# Patient Record
Sex: FEMALE | Race: BLACK OR AFRICAN AMERICAN | NOT HISPANIC OR LATINO | Employment: UNEMPLOYED | URBAN - NONMETROPOLITAN AREA
[De-identification: names, ages, dates, MRNs, and addresses within clinical notes are randomized per-mention and may not be internally consistent; named-entity substitution may affect disease eponyms.]

---

## 2022-03-30 ENCOUNTER — OFFICE VISIT (OUTPATIENT)
Dept: PODIATRY | Facility: CLINIC | Age: 27
End: 2022-03-30

## 2022-03-30 VITALS — HEART RATE: 97 BPM | BODY MASS INDEX: 24.1 KG/M2 | HEIGHT: 63 IN | WEIGHT: 136 LBS | OXYGEN SATURATION: 97 %

## 2022-03-30 DIAGNOSIS — M77.42 METATARSALGIA OF BOTH FEET: ICD-10-CM

## 2022-03-30 DIAGNOSIS — M24.573 EQUINUS CONTRACTURE OF ANKLE: Primary | ICD-10-CM

## 2022-03-30 DIAGNOSIS — M77.41 METATARSALGIA OF BOTH FEET: ICD-10-CM

## 2022-03-30 DIAGNOSIS — L84 CORNS AND CALLOSITIES: ICD-10-CM

## 2022-03-30 PROCEDURE — 99203 OFFICE O/P NEW LOW 30 MIN: CPT | Performed by: PODIATRIST

## 2022-03-30 NOTE — PROGRESS NOTES
"Payton Lee  1995  27 y.o. female      03/30/2022    Chief Complaint   Patient presents with   • Left Foot - Callouses   • Right Foot - Callouses       History of Present Illness    Payton Lee is a 27 y.o.female patient came to clinic with mother for concern of bilateral callus.       History reviewed. No pertinent past medical history.      History reviewed. No pertinent surgical history.      Family History   Problem Relation Age of Onset   • Psoriasis Mother    • Cancer Maternal Grandmother        No Known Allergies    Social History     Socioeconomic History   • Marital status: Single   Tobacco Use   • Smoking status: Never Smoker   • Smokeless tobacco: Never Used   Vaping Use   • Vaping Use: Never used   Substance and Sexual Activity   • Alcohol use: Defer   • Drug use: Defer         No current outpatient medications on file.     No current facility-administered medications for this visit.       Review of Systems   Constitutional: Negative.    HENT: Negative.    Eyes: Negative.    Respiratory: Negative.    Cardiovascular: Negative.    Gastrointestinal: Negative.    Endocrine: Negative.    Genitourinary: Negative.    Musculoskeletal:        Foot pain    Skin: Negative.    Allergic/Immunologic: Negative.    Neurological: Negative.    Hematological: Negative.    Psychiatric/Behavioral: Positive for confusion.         OBJECTIVE    Pulse 97   Ht 160 cm (63\")   Wt 61.7 kg (136 lb)   SpO2 97%   BMI 24.09 kg/m²     Physical Exam  Vitals reviewed.   Constitutional:       General: She is not in acute distress.     Appearance: She is well-developed.   HENT:      Head: Normocephalic and atraumatic.      Nose: Nose normal.   Eyes:      Conjunctiva/sclera: Conjunctivae normal.      Pupils: Pupils are equal, round, and reactive to light.   Pulmonary:      Effort: Pulmonary effort is normal. No respiratory distress.      Breath sounds: No wheezing.   Musculoskeletal:         General: No deformity. Normal range of " motion.   Skin:     General: Skin is warm and dry.      Capillary Refill: Capillary refill takes less than 2 seconds.   Neurological:      Mental Status: She is alert and oriented to person, place, and time.   Psychiatric:         Behavior: Behavior normal.         Thought Content: Thought content normal.          Lower Extremity Exam:     Cardiovascular:    Palpable pedal pulses bilateral  Musculoskeletal:  Muscle strength is WNL bilateral  Equinus bilateral  Dermatological:   Hyperkeratotic tissue noted to plantar fifth metatarsal head bilateral        Procedures        ASSESSMENT AND PLAN    Diagnoses and all orders for this visit:    1. Equinus contracture of ankle (Primary)    2. Metatarsalgia of both feet    3. Corns and callosities        - Patient examined and evaluated  -Educated patient on proper callus care.  -Start daily stretching of the Achilles tendon  -Rx for custom orthotics with offloading of the fifth metatarsal heads  - All questions were answered   - RTC as needed            This document has been electronically signed by Isaias Marley DPM on March 31, 2022 11:49 CDT     3/31/2022  11:49 CDT

## 2022-04-05 ENCOUNTER — TRANSCRIBE ORDERS (OUTPATIENT)
Dept: PODIATRY | Facility: CLINIC | Age: 27
End: 2022-04-05

## 2022-04-05 DIAGNOSIS — M24.573 EQUINUS CONTRACTURE OF ANKLE: ICD-10-CM

## 2022-04-05 DIAGNOSIS — M77.40 METATARSALGIA, UNSPECIFIED LATERALITY: Primary | ICD-10-CM

## 2022-04-14 ENCOUNTER — HOSPITAL ENCOUNTER (OUTPATIENT)
Dept: PHYSICAL THERAPY | Facility: HOSPITAL | Age: 27
Setting detail: THERAPIES SERIES
Discharge: HOME | End: 2022-04-14

## 2022-04-14 DIAGNOSIS — M24.573 EQUINUS CONTRACTURE OF ANKLE: ICD-10-CM

## 2022-04-14 DIAGNOSIS — M77.41 METATARSALGIA OF BOTH FEET: Primary | ICD-10-CM

## 2022-04-14 DIAGNOSIS — M77.42 METATARSALGIA OF BOTH FEET: Primary | ICD-10-CM

## 2022-04-14 PROCEDURE — 97162 PT EVAL MOD COMPLEX 30 MIN: CPT | Performed by: PHYSICAL THERAPIST

## 2022-04-14 NOTE — THERAPY EVALUATION
Outpatient Physical Therapy Ortho Initial Evaluation  [facility]     Patient Name: Payton Lee  : 1995  MRN: [id number]  Today's Date: 2022      Visit Date: 2022    Patient seen for 1 PT sessions.  Patient reports N/A% of improvement.  Next MD appt: ARTIE.  Recertification: N/A.    Therapy Diagnosis: B metarsalgia/Orthotics         Past Medical History:   Diagnosis Date   • Asthma         History reviewed. No pertinent surgical history.    Visit Dx:     ICD-10-CM ICD-9-CM   1. Metatarsalgia of both feet  M77.41 726.70    M77.42    2. Equinus contracture of ankle  M24.573 718.47          Patient History     Row Name 22 1600             History    Chief Complaint Pain  -AJ      Type of Pain Foot pain  -AJ      Date Current Problem(s) Began --  Chronic  -AJ      Brief Description of Current Complaint patient rpeorts B foot pain for sometime. Reports no previous orthotics.  -AJ      Patient/Caregiver Goals Relieve pain  -AJ      Current Tobacco Use None  -AJ      Smoking Status Non-smoker  -AJ      Patient's Rating of General Health Very good  -AJ      Occupation/sports/leisure activities Occupation: disabled; Hobbies: go out side and play  -AJ      History of Previous Related Injuries None  -AJ              Pain     Pain Location Foot  -AJ      Pain at Present 0  -AJ      Pain at Best 0  -AJ      Pain Frequency Intermittent  -AJ      What Performance Factors Make the Current Problem(s) WORSE? walking, standing  -AJ      What Performance Factors Make the Current Problem(s) BETTER? rest, get off feet  -AJ      Is your sleep disturbed? No  -AJ      Is medication used to assist with sleep? No  -AJ            User Key  (r) = Recorded By, (t) = Taken By, (c) = Cosigned By    Initials Name Provider Type    Mckenzie Conley, PT DPT Physical Therapist                 PT Ortho     Row Name 22 1600       Subjective Comments    Subjective Comments see history  -AJ       Precautions  and Contraindications    Precautions/Limitations no known precautions/limitations  -AJ       Subjective Pain    Able to rate subjective pain? yes  -AJ    Pre-Treatment Pain Level 0  -AJ    Post-Treatment Pain Level 0  -AJ       Posture/Observations    Alignment Options Foot pronation;Pes Planus  -AJ    Foot pronation Bilateral:;Mild;Increased  -AJ    Pes Planus Bilateral:;Mild;Increased  -    Posture/Observations Comments Wearing B tennis shoes, untied.  -AJ       Special Tests/Palpation    Special Tests/Palpation --  No areas TTP.  -AJ       General ROM    GENERAL ROM COMMENTS AROm B feet and ankles WFL.  -AJ       MMT (Manual Muscle Testing)    General MMT Comments Strengh of B feet and ankles WFL.  -AJ       Sensation    Sensation WNL? WNL  -AJ    Light Touch No apparent deficits  -AJ       Pathomechanics    Lower Extremity Pathomechanics --  B LE ER.  -AJ       Transfers    Comment, (Transfers) I with all transfers.  -AJ       Gait/Stairs (Locomotion)    Comment, (Gait/Stairs) FWB, non-antalgic gait, no distress.  -AJ          User Key  (r) = Recorded By, (t) = Taken By, (c) = Cosigned By    Initials Name Provider Type    AJ Mckenzie Edward, PT DPT Physical Therapist                            Therapy Education  Given: Other (comment) (shoe wear)  Program: New  How Provided: Verbal  Provided to: Patient, Caregiver  Level of Understanding: Verbalized      PT OP Goals     Row Name 04/14/22 1600          PT Short Term Goals    STG 1 Patient to be molded with B orthotics.  -AJ     STG 1 Progress Met  -     STG 2 Patien/Family to show understanding of good shoe wear choices.  -AJ     STG 3 Patien/Family to show understanding of B orthotic wear.  -     STG 4 Patien/Family to show understanding of B orthotic care.  -     STG 5 Patien/Family to show understanding of skin inspections.  -            Long Term Goals    LTG 1 Patien/Family to show understanding of need for follow up with referring provider  for orthotic adjustments.  -            Time Calculation    PT Goal Re-Cert Due Date --  N/A  -           User Key  (r) = Recorded By, (t) = Taken By, (c) = Cosigned By    Initials Name Provider Type    Mckenzie Conley, PT DPT Physical Therapist              Barriers to Rehab: Include significant or possible arthritic/degenerative changes that have occurred within the joints, The chronicity of this issue.    Safety Issues: None noted.        PT Assessment/Plan     Row Name 04/14/22 1600          PT Assessment    Functional Limitations Impaired gait;Limitation in home management;Limitations in community activities;Performance in leisure activities  -     Impairments Pain;Joint integrity;Impaired postural alignment  -     Assessment Comments Patient is a 26yo female who presents t othe clinic today with mother who waits in waiting areas. She has complaints of B foot pain and has B equinus deformities of the ankle with increased inversion. She was molded today with B slipper cast for molds to be sent off for custom orthotics made by lab. Good molds were obtained today.  -     Rehab Potential Good  For B orthotic wear  -     Patient/caregiver participated in establishment of treatment plan and goals Yes  -            PT Plan    PT Frequency --  1+1 = 2 visits  -     Planned CPT's? PT EVAL MOD COMPLELITY: 66366;PT THER SUPP EA 15 MIN  level 2 orthotics  -     PT Plan Comments Fit and train with B orthotics.  -           User Key  (r) = Recorded By, (t) = Taken By, (c) = Cosigned By    Initials Name Provider Type    Mckenzie Conley, PT DPT Physical Therapist            Other therapeutic activities and/or exercises will be prescribed depending on the patient's progress or lack thereof.                  Time Calculation:     Start Time: 1557  Stop Time: 1633  Time Calculation (min): 36 min     Therapy Charges for Today     Code Description Service Date Service Provider Modifiers Qty     55381658224 HC PT EVAL MOD COMPLEXITY 2 4/14/2022 Mckenzie Edward, PT DPT GP 1    19442133904 HC PT THER SUPP EA 15 MIN 4/14/2022 Mckenzie Edward, PT DPT GP 1    73535670519 HC PT-CUSTOM ORTHOTICS-LEVEL 2 4/14/2022 Mckenzie Edward, PT DPT  1                This document has been electronically signed by Mckenzie Edward PT DPT, [facility] on April 14, 2022 16:33 CDT

## 2022-05-16 ENCOUNTER — HOSPITAL ENCOUNTER (OUTPATIENT)
Dept: PHYSICAL THERAPY | Facility: HOSPITAL | Age: 27
Setting detail: THERAPIES SERIES
Discharge: HOME | End: 2022-05-16

## 2022-05-16 DIAGNOSIS — M77.42 METATARSALGIA OF BOTH FEET: Primary | ICD-10-CM

## 2022-05-16 DIAGNOSIS — M77.41 METATARSALGIA OF BOTH FEET: Primary | ICD-10-CM

## 2022-05-16 DIAGNOSIS — M24.573 EQUINUS CONTRACTURE OF ANKLE: ICD-10-CM

## 2022-05-16 NOTE — THERAPY DISCHARGE NOTE
Outpatient Physical Therapy Ortho Treatment Note/Discharge Summary  [facility]     Patient Name: Payton Lee  : 1995  MRN: [id number]  Today's Date: 2022     Pt seen for 2 PT sessions  Reported Improvement:  n/a %  MD Visit: TBD  Recheck Date: n/a    Therapy Diagnosis:  B metarsalgia/Orthotics        Visit Date: 2022    Visit Dx:    ICD-10-CM ICD-9-CM   1. Metatarsalgia of both feet  M77.41 726.70    M77.42    2. Equinus contracture of ankle  M24.573 718.47       There is no problem list on file for this patient.       Past Medical History:   Diagnosis Date   • Asthma         No past surgical history on file.                     PT Assessment/Plan     Row Name 22 1600          PT Assessment    Assessment Comments Pt in today to be fit and educated on custom orthotics.  Pt and mother instructed on proper wear schedule, wash and care instructions, skin inspections and proper shoe wear with orthotics.  Mom reports full understanding of instructions provided.  pt states they feel better already.  -            PT Plan    PT Plan Comments Discharge with all goals met.  -           User Key  (r) = Recorded By, (t) = Taken By, (c) = Cosigned By    Initials Name Provider Type    Jolly Melchor, PTA Physical Therapist Assistant                                        PT OP Goals     Row Name 22 1600          PT Short Term Goals    STG 1 Patient to be molded with B orthotics.  -     STG 1 Progress Met  -     STG 2 Patient/Family to show understanding of good shoe wear choices.  -     STG 2 Progress Met  -     STG 3 Patient/Family to show understanding of B orthotic wear.  -     STG 3 Progress Met  -     STG 4 Patient/Family to show understanding of B orthotic care.  -     STG 4 Progress Met  -     STG 5 Patient/Family to show understanding of skin inspections.  -     STG 5 Progress Met  -            Long Term Goals    LTG 1 Patient/Family to show understanding  of need for follow up with referring provider for orthotic adjustments.  -ZEINAB     LTG 1 Progress Met  -ZEINAB           User Key  (r) = Recorded By, (t) = Taken By, (c) = Cosigned By    Initials Name Provider Type    Jolly Melchor PTA Physical Therapist Assistant                Therapy Education  Given: Symptoms/condition management  Program: New  How Provided: Verbal, Written  Provided to: Patient, Caregiver  Level of Understanding: Verbalized              Time Calculation:      Therapy Charges for Today     Code Description Service Date Service Provider Modifiers Qty    91318078654 HC PT THER SUPP EA 15 MIN 5/16/2022 Jolly Poole PTA GP, CQ 1                       Jolly Poole PTA  5/16/2022

## 2022-09-23 ENCOUNTER — OFFICE VISIT (OUTPATIENT)
Dept: OTOLARYNGOLOGY | Facility: CLINIC | Age: 27
End: 2022-09-23

## 2022-09-23 VITALS — WEIGHT: 176.2 LBS | BODY MASS INDEX: 31.22 KG/M2 | HEIGHT: 63 IN | TEMPERATURE: 97 F

## 2022-09-23 DIAGNOSIS — R05.9 COUGH: Primary | ICD-10-CM

## 2022-09-23 PROCEDURE — 99203 OFFICE O/P NEW LOW 30 MIN: CPT | Performed by: OTOLARYNGOLOGY

## 2022-09-23 PROCEDURE — 31575 DIAGNOSTIC LARYNGOSCOPY: CPT | Performed by: OTOLARYNGOLOGY

## 2022-09-23 RX ORDER — MONTELUKAST SODIUM 10 MG/1
10 TABLET ORAL DAILY
Qty: 30 TABLET | Refills: 5 | Status: SHIPPED | OUTPATIENT
Start: 2022-09-23

## 2022-09-23 RX ORDER — FLUTICASONE PROPIONATE 110 UG/1
1 AEROSOL, METERED RESPIRATORY (INHALATION)
Qty: 12 G | Refills: 5 | Status: SHIPPED | OUTPATIENT
Start: 2022-09-23

## 2022-09-26 NOTE — PROGRESS NOTES
Subjective   Payton Lee is a 27 y.o. female.       History of Present Illness   Patient is here with her mother who provides a good bit of the history.  Patient reportedly has a longstanding cough that is typically nonproductive.  Notes from Dr. Aline Sadler's office are available and are reviewed and it appears she was diagnosed with asthma and given albuterol as needed.  She is not currently on any medications.  Has a history of previous cleft palate repair.  Not a lot of rhinorrhea.  No overt symptoms of acid reflux.      The following portions of the patient's history were reviewed and updated as appropriate: allergies, current medications, past family history, past medical history, past social history, past surgical history and problem list.     reports that she has never smoked. She has never used smokeless tobacco. She reports that she does not drink alcohol and does not use drugs.   Patient is not a tobacco user and has not been counseled for use of tobacco products      Review of Systems        Objective   Physical Exam  General: Female no acute distress.  No breathiness or stridor  Ears: External ears no deformity, canals no discharge, tympanic membranes intact clear and mobile bilaterally.  Nares: No discharge or purulence  Oral cavity: High arched palate with evidence of previous cleft repair  Pharynx: No erythema exudate or mass  Neck no adenopathy or mass    Procedure Note    Pre-operative Diagnosis:   Chief Complaint   Patient presents with   • Cough       Post-operative Diagnosis: No obvious laryngeal abnormality    Anesthesia: topical with xylocaine and neosynephrine    Endoscopy Type:  Flexible Laryngoscopy    Procedure Details:    The patient was placed in the sitting position.  After topical anesthesia and decongestion, the 4 mm laryngoscope was passed.  The nasal cavities, nasopharynx, oropharynx, hypopharynx, and larynx were all examined.  Vocal cords were examined during respiration and  phonation.  The following findings were noted:    Findings: No masses in the nose or nasopharynx.  Tongue base and hypopharynx show no evidence of neoplasm.  Endolarynx shows no significant erythema or edema.  Vocal cord mobility is intact.  There is no evidence of laryngeal neoplasm.    Condition:  Stable.  Patient tolerated procedure well.    Complications:  None         Assessment and Plan   Diagnoses and all orders for this visit:    1. Cough (Primary)    Other orders  -     fluticasone (FLOVENT HFA) 110 MCG/ACT inhaler; Inhale 1 puff 2 (Two) Times a Day.  Dispense: 12 g; Refill: 5  -     montelukast (SINGULAIR) 10 MG tablet; Take 1 tablet by mouth Daily.  Dispense: 30 tablet; Refill: 5               Plan: Given the lack of abnormalities in the upper airway I suspect this is a manifestation of reactive airway disease.  We will prescribe Flovent and Singulair and have her return in 6 weeks.  Call sooner for problems.

## 2022-11-11 ENCOUNTER — OFFICE VISIT (OUTPATIENT)
Dept: OTOLARYNGOLOGY | Facility: CLINIC | Age: 27
End: 2022-11-11

## 2022-11-11 VITALS — OXYGEN SATURATION: 100 % | BODY MASS INDEX: 31.71 KG/M2 | WEIGHT: 179 LBS | HEIGHT: 63 IN

## 2022-11-11 DIAGNOSIS — R05.9 COUGH, UNSPECIFIED TYPE: Primary | ICD-10-CM

## 2022-11-11 PROCEDURE — 99213 OFFICE O/P EST LOW 20 MIN: CPT | Performed by: OTOLARYNGOLOGY

## 2022-11-11 NOTE — PROGRESS NOTES
Subjective   Payton Lee is a 27 y.o. female.       History of Present Illness   Patient was seen previously with a history of nonproductive cough.  Really had no remarkable findings in the nose or larynx.  Was given Flovent and Singulair.  Mother reports this helped a little but that the cough is still present.      The following portions of the patient's history were reviewed and updated as appropriate: allergies, current medications, past family history, past medical history, past social history, past surgical history and problem list.     reports that she has never smoked. She has never used smokeless tobacco. She reports that she does not drink alcohol and does not use drugs.   Patient is not a tobacco user and has not been counseled for use of tobacco products      Review of Systems        Objective   Physical Exam  No coughing is heard during the exam today  Ears: External ears no deformity, canals no discharge, tympanic membranes intact clear and mobile bilaterally.  Nares no discharge or purulence  Oral cavity no masses or lesions  Pharynx: No erythema or exudate  Neck: No adenopathy or mass         Assessment and Plan   Diagnoses and all orders for this visit:    1. Cough, unspecified type (Primary)             Plan: Told mom that there was really nothing else for me to treat from an ear nose and throat standpoint.  If she felt like the Flovent and Singulair have been of benefit they should be considered and I think she should return to Dr. West for reevaluation since she was apparently diagnosed with asthma at one point.  May follow-up with me as needed.